# Patient Record
Sex: FEMALE | Race: WHITE
[De-identification: names, ages, dates, MRNs, and addresses within clinical notes are randomized per-mention and may not be internally consistent; named-entity substitution may affect disease eponyms.]

---
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
## 2023-09-10 PROBLEM — B34.9 NONSPECIFIC SYNDROME SUGGESTIVE OF VIRAL ILLNESS: Status: ACTIVE | Noted: 2023-09-10

## 2023-09-10 PROBLEM — H10.45 CHRONIC ALLERGIC CONJUNCTIVITIS: Status: ACTIVE | Noted: 2023-09-10

## 2023-09-10 PROBLEM — L50.0 ALLERGIC URTICARIA: Status: ACTIVE | Noted: 2023-09-10

## 2023-09-10 PROBLEM — R11.2 NAUSEA AND VOMITING: Status: ACTIVE | Noted: 2023-09-10

## 2023-09-10 PROBLEM — K21.9 GERD (GASTROESOPHAGEAL REFLUX DISEASE): Status: ACTIVE | Noted: 2023-09-10

## 2023-09-10 PROBLEM — G90.A POTS (POSTURAL ORTHOSTATIC TACHYCARDIA SYNDROME): Status: ACTIVE | Noted: 2023-09-10

## 2023-09-10 PROBLEM — L23.9 ALLERGIC DERMATITIS: Status: ACTIVE | Noted: 2023-09-10

## 2023-09-10 PROBLEM — F41.9 ANXIETY: Status: ACTIVE | Noted: 2023-09-10

## 2023-09-10 PROBLEM — R53.82 CHRONIC FATIGUE: Status: ACTIVE | Noted: 2023-09-10

## 2023-09-10 PROBLEM — F32.9 MDD (MAJOR DEPRESSIVE DISORDER): Status: ACTIVE | Noted: 2023-09-10

## 2023-09-10 PROBLEM — J31.0 RHINITIS, CHRONIC: Status: ACTIVE | Noted: 2023-09-10

## 2023-09-10 PROBLEM — R11.0 NAUSEA IN PEDIATRIC PATIENT: Status: ACTIVE | Noted: 2023-09-10

## 2023-09-10 PROBLEM — G47.00 INSOMNIA: Status: ACTIVE | Noted: 2023-09-10

## 2023-09-10 PROBLEM — J02.9 SORE THROAT: Status: ACTIVE | Noted: 2023-09-10

## 2023-09-10 PROBLEM — R07.9 CHEST PAIN: Status: ACTIVE | Noted: 2023-09-10

## 2023-09-10 PROBLEM — R05.9 COUGH: Status: ACTIVE | Noted: 2023-09-10

## 2023-09-10 PROBLEM — N93.9 ABNORMAL UTERINE BLEEDING (AUB): Status: ACTIVE | Noted: 2023-09-10

## 2023-09-10 RX ORDER — FERROUS SULFATE 325(65) MG
TABLET ORAL
COMMUNITY

## 2023-09-10 RX ORDER — TRAZODONE HYDROCHLORIDE 100 MG/1
TABLET ORAL
COMMUNITY
Start: 2020-01-14 | End: 2023-11-14 | Stop reason: SDUPTHER

## 2023-09-10 RX ORDER — FLUDROCORTISONE ACETATE 0.1 MG/1
1 TABLET ORAL 2 TIMES DAILY
COMMUNITY
Start: 2022-03-07

## 2023-09-10 RX ORDER — METHOCARBAMOL 750 MG/1
1 TABLET, FILM COATED ORAL 4 TIMES DAILY PRN
COMMUNITY
Start: 2022-11-06

## 2023-09-10 RX ORDER — SERTRALINE HYDROCHLORIDE 100 MG/1
1 TABLET, FILM COATED ORAL DAILY
COMMUNITY
Start: 2019-06-18 | End: 2023-11-14 | Stop reason: SDUPTHER

## 2023-09-10 RX ORDER — SODIUM CHLORIDE 1000 MG
TABLET, SOLUBLE MISCELLANEOUS
COMMUNITY
Start: 2019-11-22

## 2023-11-14 ENCOUNTER — TELEMEDICINE (OUTPATIENT)
Dept: BEHAVIORAL HEALTH | Facility: CLINIC | Age: 23
End: 2023-11-14
Payer: COMMERCIAL

## 2023-11-14 DIAGNOSIS — F33.0 MILD EPISODE OF RECURRENT MAJOR DEPRESSIVE DISORDER (CMS-HCC): ICD-10-CM

## 2023-11-14 DIAGNOSIS — F51.01 PRIMARY INSOMNIA: ICD-10-CM

## 2023-11-14 PROCEDURE — 99212 OFFICE O/P EST SF 10 MIN: CPT

## 2023-11-14 RX ORDER — TRAZODONE HYDROCHLORIDE 100 MG/1
100 TABLET ORAL NIGHTLY
Qty: 90 TABLET | Refills: 3 | Status: SHIPPED | OUTPATIENT
Start: 2023-11-14 | End: 2024-11-13

## 2023-11-14 RX ORDER — SERTRALINE HYDROCHLORIDE 100 MG/1
100 TABLET, FILM COATED ORAL DAILY
Qty: 90 TABLET | Refills: 3 | Status: SHIPPED | OUTPATIENT
Start: 2023-11-14 | End: 2024-02-06 | Stop reason: SDUPTHER

## 2023-11-14 ASSESSMENT — ENCOUNTER SYMPTOMS
ALLERGIC/IMMUNOLOGIC NEGATIVE: 1
MUSCULOSKELETAL NEGATIVE: 1
CARDIOVASCULAR NEGATIVE: 1
GASTROINTESTINAL NEGATIVE: 1
CONSTITUTIONAL NEGATIVE: 1
ENDOCRINE NEGATIVE: 1
HEMATOLOGIC/LYMPHATIC NEGATIVE: 1
PSYCHIATRIC NEGATIVE: 1
RESPIRATORY NEGATIVE: 1
NEUROLOGICAL NEGATIVE: 1
EYES NEGATIVE: 1

## 2023-11-14 NOTE — PROGRESS NOTES
Subjective   Patient ID: Kiya Hernandez is a 23 y.o. female who presents for MDD, panic disorder, and PTSD    HPI  With the use sertraline 100 mg depression and anxiety is at a minimal. States that she has been free of panic attacks.   She has reduce her dose of trazodone to 100 from 150 mg  and feels she receives healthy sleep of 7-8hrs.     Review of Systems   Constitutional: Negative.    HENT: Negative.     Eyes: Negative.    Respiratory: Negative.     Cardiovascular: Negative.    Gastrointestinal: Negative.    Endocrine: Negative.    Genitourinary: Negative.    Musculoskeletal: Negative.    Skin: Negative.    Allergic/Immunologic: Negative.    Neurological: Negative.    Hematological: Negative.    Psychiatric/Behavioral: Negative.         Objective   Kiya is a 21 y/o  F who has a hx of panic disorders, MDD, and PTSD. She has been stable with the use of sertraline 100 mg and has reduce her dose of trazodone to 150- 100 mg.     Acute risk of self-harm remains low despite current stressors (acute and chronic). No reported thoughts of self-harm, plan, or intent. She is future-oriented, feels responsibility for her children, has supportive family and friends, and has no recent SA or hospitalizations.          Assessment/Plan   Continue sertraline 100 mg to resolve depression, panic disorder, and PTSD.  Continue trazodone 100 mg to target insomnia.   Please seek therapy to help resolve anxiety, depression, and PTSD by utilizing [employer] website.     Advised to call [phone] to report any urgent concerns and/or schedule a sooner follow-up.      Provided with crisis/emergency resources, including Mobile Crisis [phone], Crisis Text Line (text [id number] gt 010233) and the National Suicide Prevention Lifeline hotline 1-936.448.4843. Agrees to call 911 or go to the nearest emergency department if s/he feels unsafe, or has suicidal thinking with a plan or intent.      Next appointment: 2/4 at 4:00 (v)

## 2024-02-06 ENCOUNTER — TELEMEDICINE (OUTPATIENT)
Dept: BEHAVIORAL HEALTH | Facility: CLINIC | Age: 24
End: 2024-02-06
Payer: COMMERCIAL

## 2024-02-06 DIAGNOSIS — F33.0 MILD EPISODE OF RECURRENT MAJOR DEPRESSIVE DISORDER (CMS-HCC): ICD-10-CM

## 2024-02-06 PROCEDURE — 99213 OFFICE O/P EST LOW 20 MIN: CPT

## 2024-02-06 RX ORDER — SERTRALINE HYDROCHLORIDE 100 MG/1
150 TABLET, FILM COATED ORAL DAILY
Qty: 135 TABLET | Refills: 0 | Status: SHIPPED | OUTPATIENT
Start: 2024-02-06 | End: 2025-02-05

## 2024-02-06 ASSESSMENT — PATIENT HEALTH QUESTIONNAIRE - PHQ9
9. THOUGHTS THAT YOU WOULD BE BETTER OFF DEAD, OR OF HURTING YOURSELF: NOT AT ALL
4. FEELING TIRED OR HAVING LITTLE ENERGY: NOT AT ALL
6. FEELING BAD ABOUT YOURSELF - OR THAT YOU ARE A FAILURE OR HAVE LET YOURSELF OR YOUR FAMILY DOWN: NOT AT ALL
5. POOR APPETITE OR OVEREATING: NOT AT ALL
1. LITTLE INTEREST OR PLEASURE IN DOING THINGS: NOT AT ALL
7. TROUBLE CONCENTRATING ON THINGS, SUCH AS READING THE NEWSPAPER OR WATCHING TELEVISION: NOT AT ALL
8. MOVING OR SPEAKING SO SLOWLY THAT OTHER PEOPLE COULD HAVE NOTICED. OR THE OPPOSITE, BEING SO FIGETY OR RESTLESS THAT YOU HAVE BEEN MOVING AROUND A LOT MORE THAN USUAL: NOT AT ALL
2. FEELING DOWN, DEPRESSED OR HOPELESS: NOT AT ALL
3. TROUBLE FALLING OR STAYING ASLEEP OR SLEEPING TOO MUCH: NOT AT ALL

## 2024-02-06 ASSESSMENT — ENCOUNTER SYMPTOMS
CONSTITUTIONAL NEGATIVE: 1
ENDOCRINE NEGATIVE: 1
EYES NEGATIVE: 1

## 2024-02-06 NOTE — PROGRESS NOTES
Subjective   Patient ID: Kiya Hernandez is a 23 y.o. female who presents for panic disorders, MDD, and PTSD.      HPI  With the use of sertraline panic attacks duration are shorter,less frequent, and feels less intense by 90%. Also feels that anxiety and depression has stabilized. Recognized that exercising improves her mood and anxiety. She also noticed PTSD symptoms are improving she is now able to be touched by others and feels less startled.      Review of Systems   Constitutional: Negative.    Eyes: Negative.    Endocrine: Negative.    Genitourinary: Negative.        Objective   Kiya is a 23 y/o  F who has a hx of panic disorders, MDD, and PTSD. She has been stable with the use of sertraline 100 mg and feels that panic attacks are less frequent and intense. She also feels that PTSD symptoms are resolving.      Acute risk of self-harm remains low despite current stressors (acute and chronic). No reported thoughts of self-harm, plan, or intent. She is future-oriented, feels responsibility for her children, has supportive family and friends, and has no recent SA or hospitalizations.     Assessment/Plan   Continue sertraline 150 mg to resolve depression, panic disorder, and PTSD.  Continue trazodone 100 mg to target insomnia.   Please seek therapy to help resolve anxiety, depression, and PTSD by utilizing [employer] website.     Advised to call [phone] to report any urgent concerns and/or schedule a sooner follow-up.      Provided with crisis/emergency resources, including Mobile Crisis [phone], Crisis Text Line (text [id number] al 962468) and the National Suicide Prevention Lifeline hotline 1-661.877.3517. Agrees to call 911 or go to the nearest emergency department if s/he feels unsafe, or has suicidal thinking with a plan or intent.      Next appointment: 3/12 at 4:00 (v)

## 2024-03-12 ENCOUNTER — APPOINTMENT (OUTPATIENT)
Dept: BEHAVIORAL HEALTH | Facility: CLINIC | Age: 24
End: 2024-03-12
Payer: COMMERCIAL

## 2024-10-08 ENCOUNTER — TELEPHONE (OUTPATIENT)
Dept: OTHER | Age: 24
End: 2024-10-08
Payer: COMMERCIAL

## 2024-10-15 ENCOUNTER — APPOINTMENT (OUTPATIENT)
Dept: BEHAVIORAL HEALTH | Facility: CLINIC | Age: 24
End: 2024-10-15
Payer: COMMERCIAL

## 2024-12-19 DIAGNOSIS — F33.0 MILD EPISODE OF RECURRENT MAJOR DEPRESSIVE DISORDER (CMS-HCC): ICD-10-CM

## 2024-12-19 RX ORDER — SERTRALINE HYDROCHLORIDE 100 MG/1
15 TABLET, FILM COATED ORAL DAILY
Qty: 45 TABLET | Refills: 1 | OUTPATIENT
Start: 2024-12-19